# Patient Record
Sex: FEMALE | Race: WHITE
[De-identification: names, ages, dates, MRNs, and addresses within clinical notes are randomized per-mention and may not be internally consistent; named-entity substitution may affect disease eponyms.]

---

## 2019-12-17 ENCOUNTER — HOSPITAL ENCOUNTER (INPATIENT)
Dept: HOSPITAL 26 - MED | Age: 64
LOS: 3 days | Discharge: NURSING, REHAB OR LONG-TERM CARE | DRG: 637 | End: 2019-12-20
Attending: GENERAL PRACTICE | Admitting: GENERAL PRACTICE
Payer: COMMERCIAL

## 2019-12-17 VITALS — SYSTOLIC BLOOD PRESSURE: 172 MMHG | DIASTOLIC BLOOD PRESSURE: 89 MMHG

## 2019-12-17 VITALS — WEIGHT: 158 LBS | HEIGHT: 63 IN | BODY MASS INDEX: 28 KG/M2

## 2019-12-17 VITALS — DIASTOLIC BLOOD PRESSURE: 102 MMHG | SYSTOLIC BLOOD PRESSURE: 167 MMHG

## 2019-12-17 DIAGNOSIS — F15.90: ICD-10-CM

## 2019-12-17 DIAGNOSIS — I10: ICD-10-CM

## 2019-12-17 DIAGNOSIS — E11.65: ICD-10-CM

## 2019-12-17 DIAGNOSIS — E83.42: ICD-10-CM

## 2019-12-17 DIAGNOSIS — Z23: ICD-10-CM

## 2019-12-17 DIAGNOSIS — Z59.0: ICD-10-CM

## 2019-12-17 DIAGNOSIS — E11.00: Primary | ICD-10-CM

## 2019-12-17 DIAGNOSIS — F12.99: ICD-10-CM

## 2019-12-17 DIAGNOSIS — F17.210: ICD-10-CM

## 2019-12-17 DIAGNOSIS — E43: ICD-10-CM

## 2019-12-17 LAB
ALBUMIN FLD-MCNC: 2.7 G/DL (ref 3.4–5)
ANION GAP SERPL CALCULATED.3IONS-SCNC: 12.5 MMOL/L (ref 8–16)
APPEARANCE UR: (no result)
AST SERPL-CCNC: 12 U/L (ref 15–37)
BARBITURATES UR QL SCN: (no result) NG/ML
BASOPHILS # BLD AUTO: 0.1 K/UL (ref 0–0.22)
BASOPHILS NFR BLD AUTO: 1 % (ref 0–2)
BENZODIAZ UR QL SCN: (no result) NG/ML
BILIRUB SERPL-MCNC: 0.2 MG/DL (ref 0–1)
BILIRUB UR QL STRIP: NEGATIVE
BUN SERPL-MCNC: 7 MG/DL (ref 7–18)
BZE UR QL SCN: (no result) NG/ML
CANNABINOIDS UR QL SCN: (no result) NG/ML
CHLORIDE SERPL-SCNC: 99 MMOL/L (ref 98–107)
CO2 SERPL-SCNC: 28.1 MMOL/L (ref 21–32)
COLOR UR: YELLOW
CREAT SERPL-MCNC: 0.7 MG/DL (ref 0.6–1.3)
EOSINOPHIL # BLD AUTO: 0.1 K/UL (ref 0–0.4)
EOSINOPHIL NFR BLD AUTO: 0.6 % (ref 0–4)
ERYTHROCYTE [DISTWIDTH] IN BLOOD BY AUTOMATED COUNT: 13.1 % (ref 11.6–13.7)
GFR SERPL CREATININE-BSD FRML MDRD: 108 ML/MIN (ref 90–?)
GLUCOSE SERPL-MCNC: 400 MG/DL (ref 74–106)
GLUCOSE UR STRIP-MCNC: (no result) MG/DL
HCT VFR BLD AUTO: 42.5 % (ref 36–48)
HGB BLD-MCNC: 14.1 G/DL (ref 12–16)
HGB UR QL STRIP: (no result)
LEUKOCYTE ESTERASE UR QL STRIP: NEGATIVE
LYMPHOCYTES # BLD AUTO: 1.6 K/UL (ref 2.5–16.5)
LYMPHOCYTES NFR BLD AUTO: 17 % (ref 20.5–51.1)
MAGNESIUM SERPL-MCNC: 1.7 MG/DL (ref 1.8–2.4)
MCH RBC QN AUTO: 31 PG (ref 27–31)
MCHC RBC AUTO-ENTMCNC: 33 G/DL (ref 33–37)
MCV RBC AUTO: 94.4 FL (ref 80–94)
MONOCYTES # BLD AUTO: 0.6 K/UL (ref 0.8–1)
MONOCYTES NFR BLD AUTO: 6.6 % (ref 1.7–9.3)
NEUTROPHILS # BLD AUTO: 7 K/UL (ref 1.8–7.7)
NEUTROPHILS NFR BLD AUTO: 74.8 % (ref 42.2–75.2)
NITRITE UR QL STRIP: NEGATIVE
OPIATES UR QL SCN: (no result) NG/ML
PCP UR QL SCN: (no result) NG/ML
PH UR STRIP: 8 [PH] (ref 5–9)
PHOSPHATE SERPL-MCNC: 2.7 MG/DL (ref 2.5–4.9)
PLATELET # BLD AUTO: 338 K/UL (ref 140–450)
POTASSIUM SERPL-SCNC: 3.6 MMOL/L (ref 3.5–5.1)
PROTHROMBIN TIME: 9 SECS (ref 10.8–13.4)
RBC # BLD AUTO: 4.5 MIL/UL (ref 4.2–5.4)
RBC #/AREA URNS HPF: (no result) /HPF (ref 0–5)
SODIUM SERPL-SCNC: 136 MMOL/L (ref 136–145)
TSH SERPL DL<=0.05 MIU/L-ACNC: 1.25 UIU/ML (ref 0.34–3.74)
WBC # BLD AUTO: 9.3 K/UL (ref 4.8–10.8)
WBC,URINE: (no result) /HPF (ref 0–5)

## 2019-12-17 RX ADMIN — SODIUM CHLORIDE SCH MLS/HR: 9 INJECTION, SOLUTION INTRAVENOUS at 21:46

## 2019-12-17 RX ADMIN — INSULIN LISPRO PRN UNITS: 100 INJECTION, SOLUTION INTRAVENOUS; SUBCUTANEOUS at 21:58

## 2019-12-17 RX ADMIN — Medication SCH DEV: at 21:57

## 2019-12-17 SDOH — ECONOMIC STABILITY - HOUSING INSECURITY: HOMELESSNESS: Z59.0

## 2019-12-17 NOTE — NUR
Patient will be admitted to [facility]. TRANSFERRED VIA GURNEY. Will go to room 119B. 
Belongings list completed. Report to DIRK BRENNAN.

## 2019-12-17 NOTE — NUR
63 Y/O FEMALE BIBA WITH C/C OF HYPERGLYCEMIA. PER MEDIC REPORT, PT IS HOMELESS 
AND NONCOMPLIANT WITH MEDICATION. PT A/OX4. PER PT ALLERGIES TO PNC. MEDICAL HX 
OF DM. RX METFORMIN. DENIES V/N. PER PT DIARRHEA FOR "A NUMBER OF DAYS". 
CURRENT . SIDE RAIL X1.

## 2019-12-17 NOTE — NUR
ADMITTED A 64 YEAR OLD FEMALE VIA GURNEY FROM ED. RECEIVED REPORT FROM ED NURSE NIKI. 
PATIENT ALERT AND ORIENTED X4. NO APPARENT DISTRESS NOTED. DENIES PAIN NOR DISCOMFORT. SKIN 
IS INTACT. BED ON LOW POSITION. INTRODUCED SELF AND ORIENTED TO HOSPITAL ROUTINE AND 
ENVIRONMENT. CALL LIGHT WITHIN REACH. WILL CONTINUE TO MONITOR.

-------------------------------------------------------------------------------

Addendum: 12/18/19 at 0134 by Jacqueline Abad RN

-------------------------------------------------------------------------------

WITH IV ON LEFT AC 20G. SALINE LOCKED. PATIENT IS HOMELESS.

## 2019-12-17 NOTE — NUR
PATIENT ASLEEP IN BED. NO APPARENT DISTRESS NOTED. VISIBLE CHEST RISE AND FALL NOTED. WILL 
CONTINUE TO MONITOR.

## 2019-12-18 VITALS — SYSTOLIC BLOOD PRESSURE: 139 MMHG | DIASTOLIC BLOOD PRESSURE: 60 MMHG

## 2019-12-18 VITALS — DIASTOLIC BLOOD PRESSURE: 58 MMHG | SYSTOLIC BLOOD PRESSURE: 113 MMHG

## 2019-12-18 VITALS — SYSTOLIC BLOOD PRESSURE: 161 MMHG | DIASTOLIC BLOOD PRESSURE: 87 MMHG

## 2019-12-18 VITALS — SYSTOLIC BLOOD PRESSURE: 151 MMHG | DIASTOLIC BLOOD PRESSURE: 83 MMHG

## 2019-12-18 VITALS — SYSTOLIC BLOOD PRESSURE: 159 MMHG | DIASTOLIC BLOOD PRESSURE: 91 MMHG

## 2019-12-18 LAB
ANION GAP SERPL CALCULATED.3IONS-SCNC: 13.1 MMOL/L (ref 8–16)
BASOPHILS # BLD AUTO: 0 K/UL (ref 0–0.22)
BASOPHILS NFR BLD AUTO: 0.2 % (ref 0–2)
BUN SERPL-MCNC: 6 MG/DL (ref 7–18)
CHLORIDE SERPL-SCNC: 103 MMOL/L (ref 98–107)
CHOLEST/HDLC SERPL: 3 {RATIO} (ref 1–4.5)
CO2 SERPL-SCNC: 24.9 MMOL/L (ref 21–32)
CREAT SERPL-MCNC: 0.5 MG/DL (ref 0.6–1.3)
EOSINOPHIL # BLD AUTO: 0.1 K/UL (ref 0–0.4)
EOSINOPHIL NFR BLD AUTO: 0.8 % (ref 0–4)
ERYTHROCYTE [DISTWIDTH] IN BLOOD BY AUTOMATED COUNT: 13 % (ref 11.6–13.7)
GFR SERPL CREATININE-BSD FRML MDRD: 159 ML/MIN (ref 90–?)
GLUCOSE SERPL-MCNC: 155 MG/DL (ref 74–106)
HCT VFR BLD AUTO: 37 % (ref 36–48)
HDLC SERPL-MCNC: 59 MG/DL (ref 40–60)
HGB BLD-MCNC: 12.3 G/DL (ref 12–16)
LDLC SERPL CALC-MCNC: 93 MG/DL (ref 60–100)
LYMPHOCYTES # BLD AUTO: 2 K/UL (ref 2.5–16.5)
LYMPHOCYTES NFR BLD AUTO: 25.7 % (ref 20.5–51.1)
MAGNESIUM SERPL-MCNC: 1.4 MG/DL (ref 1.8–2.4)
MCH RBC QN AUTO: 31 PG (ref 27–31)
MCHC RBC AUTO-ENTMCNC: 33 G/DL (ref 33–37)
MCV RBC AUTO: 93.4 FL (ref 80–94)
MONOCYTES # BLD AUTO: 0.6 K/UL (ref 0.8–1)
MONOCYTES NFR BLD AUTO: 8.2 % (ref 1.7–9.3)
NEUTROPHILS # BLD AUTO: 5.1 K/UL (ref 1.8–7.7)
NEUTROPHILS NFR BLD AUTO: 65.1 % (ref 42.2–75.2)
PHOSPHATE SERPL-MCNC: 2.7 MG/DL (ref 2.5–4.9)
PLATELET # BLD AUTO: 301 K/UL (ref 140–450)
POTASSIUM SERPL-SCNC: 3 MMOL/L (ref 3.5–5.1)
RBC # BLD AUTO: 3.96 MIL/UL (ref 4.2–5.4)
SODIUM SERPL-SCNC: 138 MMOL/L (ref 136–145)
T4 SERPL-MCNC: 6.6 UG/DL (ref 4.5–12)
TRIGL SERPL-MCNC: 120 MG/DL (ref 30–150)
WBC # BLD AUTO: 7.9 K/UL (ref 4.8–10.8)

## 2019-12-18 PROCEDURE — 3E0234Z INTRODUCTION OF SERUM, TOXOID AND VACCINE INTO MUSCLE, PERCUTANEOUS APPROACH: ICD-10-PCS | Performed by: CONTRACTOR

## 2019-12-18 RX ADMIN — INSULIN LISPRO PRN UNITS: 100 INJECTION, SOLUTION INTRAVENOUS; SUBCUTANEOUS at 12:28

## 2019-12-18 RX ADMIN — Medication SCH DEV: at 06:32

## 2019-12-18 RX ADMIN — Medication SCH DEV: at 16:30

## 2019-12-18 RX ADMIN — POTASSIUM CHLORIDE SCH MEQ: 750 TABLET, FILM COATED, EXTENDED RELEASE ORAL at 20:05

## 2019-12-18 RX ADMIN — Medication SCH DEV: at 11:53

## 2019-12-18 RX ADMIN — INSULIN LISPRO PRN UNITS: 100 INJECTION, SOLUTION INTRAVENOUS; SUBCUTANEOUS at 17:50

## 2019-12-18 RX ADMIN — INSULIN LISPRO PRN UNITS: 100 INJECTION, SOLUTION INTRAVENOUS; SUBCUTANEOUS at 20:09

## 2019-12-18 RX ADMIN — POTASSIUM CHLORIDE SCH MEQ: 750 TABLET, FILM COATED, EXTENDED RELEASE ORAL at 08:33

## 2019-12-18 RX ADMIN — SODIUM CHLORIDE SCH MLS/HR: 9 INJECTION, SOLUTION INTRAVENOUS at 23:08

## 2019-12-18 RX ADMIN — INSULIN LISPRO PRN UNITS: 100 INJECTION, SOLUTION INTRAVENOUS; SUBCUTANEOUS at 06:16

## 2019-12-18 RX ADMIN — Medication SCH DEV: at 20:09

## 2019-12-18 RX ADMIN — LISINOPRIL SCH MG: 5 TABLET ORAL at 08:32

## 2019-12-18 RX ADMIN — SODIUM CHLORIDE SCH MLS/HR: 9 INJECTION, SOLUTION INTRAVENOUS at 05:09

## 2019-12-18 RX ADMIN — SODIUM CHLORIDE SCH MLS/HR: 9 INJECTION, SOLUTION INTRAVENOUS at 16:09

## 2019-12-18 NOTE — NUR
ROUNDS DONE. PATIENT ASLEEP IN BED. NO APPARENT DISTRESS  NOTED. BED ON LOW POSITION. 
VISIBLE CHEST RISE AND FALL NOTED. WILL CONTINUE TO MONITOR.

## 2019-12-18 NOTE — NUR
PATIENT HAS BEEN SCREENED AND CATEGORIZED AS MODERATE NUTRITION RISK. PATIENT WILL BE SEEN 
WITHIN 3-5 DAYS OF ADMISSION.



12/20/19 12/22/19



[address]IN [address]

## 2019-12-18 NOTE — NUR
CHECKS DONE. PATIENT ASLEEP IN BED. BED ON LOW POSITION. NO APPARENT DISTRESS NOTED. WILL 
CONTINUE TO MONITOR.

## 2019-12-18 NOTE — NUR
PT SLEEPING COMFORTABLY BUT AROUSABLE. NO S/S OF DISTRESS NOTED. NO COMPLAINTS OF PAIN. NO 
SOB. AFEBRILE. WILL CONTINUE TO MONITOR.

## 2019-12-18 NOTE — NUR
Shift report received from night shift nurse. Pt is in bed in stable condition. Call light 
in reach.

## 2019-12-18 NOTE — NUR
REPORT RECEIVED FROM AM NURSE AT BEDSIDE. PT IN STABLE CONDITION. AAOX3-4. INTRODUCED SELF 
TO PT. BOARD UPDATED. NO COMPLAINTS OF PAIN. NO SOB. AFEBRILE. PT IS WHEELCHAIR BOUND. IV 
SITE R FA 24G RUNNING NS@100ML/HR PATENT AND INTACT. SKIN WARM, DRY, AND INTACT WITH NO OPEN 
WOUNDS. BED LOCKED IN LOW POSITION. CALL BELL WITHIN REACH. SAFETY PRECAUTION IN PLACE. ALL 
NEEDS MET AT THIS TIME.

## 2019-12-18 NOTE — NUR
PATIENT AWAKE IN BED. NO APPARENT DISTRESS NOTED. DENIES PAIN NOR DISCOMFORT. WILL CONTINUE 
TO MONITOR.

## 2019-12-18 NOTE — NUR
PATIENT ASLEEP IN BED. BED ON LOW POSITION. NO APPARENT DISTRESS NOTED. VISIBLE CHEST RISE 
AND FALL NOTED. WILL CONTINUE TO MONITOR.

## 2019-12-19 VITALS — DIASTOLIC BLOOD PRESSURE: 93 MMHG | SYSTOLIC BLOOD PRESSURE: 161 MMHG

## 2019-12-19 VITALS — DIASTOLIC BLOOD PRESSURE: 84 MMHG | SYSTOLIC BLOOD PRESSURE: 143 MMHG

## 2019-12-19 VITALS — SYSTOLIC BLOOD PRESSURE: 142 MMHG | DIASTOLIC BLOOD PRESSURE: 74 MMHG

## 2019-12-19 LAB
ANION GAP SERPL CALCULATED.3IONS-SCNC: 11.6 MMOL/L (ref 8–16)
BASOPHILS # BLD AUTO: 0.1 K/UL (ref 0–0.22)
BASOPHILS NFR BLD AUTO: 1.2 % (ref 0–2)
BUN SERPL-MCNC: 9 MG/DL (ref 7–18)
CHLORIDE SERPL-SCNC: 102 MMOL/L (ref 98–107)
CO2 SERPL-SCNC: 26.6 MMOL/L (ref 21–32)
CREAT SERPL-MCNC: 0.5 MG/DL (ref 0.6–1.3)
EOSINOPHIL # BLD AUTO: 0 K/UL (ref 0–0.4)
EOSINOPHIL NFR BLD AUTO: 0.3 % (ref 0–4)
ERYTHROCYTE [DISTWIDTH] IN BLOOD BY AUTOMATED COUNT: 13.4 % (ref 11.6–13.7)
GFR SERPL CREATININE-BSD FRML MDRD: 159 ML/MIN (ref 90–?)
GLUCOSE SERPL-MCNC: 204 MG/DL (ref 74–106)
HCT VFR BLD AUTO: 37.8 % (ref 36–48)
HGB BLD-MCNC: 12.5 G/DL (ref 12–16)
LYMPHOCYTES # BLD AUTO: 1.8 K/UL (ref 2.5–16.5)
LYMPHOCYTES NFR BLD AUTO: 23 % (ref 20.5–51.1)
MAGNESIUM SERPL-MCNC: 1.5 MG/DL (ref 1.8–2.4)
MCH RBC QN AUTO: 31 PG (ref 27–31)
MCHC RBC AUTO-ENTMCNC: 33 G/DL (ref 33–37)
MCV RBC AUTO: 94 FL (ref 80–94)
MONOCYTES # BLD AUTO: 0.6 K/UL (ref 0.8–1)
MONOCYTES NFR BLD AUTO: 7.2 % (ref 1.7–9.3)
NEUTROPHILS # BLD AUTO: 5.4 K/UL (ref 1.8–7.7)
NEUTROPHILS NFR BLD AUTO: 68.3 % (ref 42.2–75.2)
PHOSPHATE SERPL-MCNC: 2.6 MG/DL (ref 2.5–4.9)
PLATELET # BLD AUTO: 306 K/UL (ref 140–450)
POTASSIUM SERPL-SCNC: 4.2 MMOL/L (ref 3.5–5.1)
RBC # BLD AUTO: 4.02 MIL/UL (ref 4.2–5.4)
SODIUM SERPL-SCNC: 136 MMOL/L (ref 136–145)
WBC # BLD AUTO: 8 K/UL (ref 4.8–10.8)

## 2019-12-19 RX ADMIN — INSULIN LISPRO PRN UNITS: 100 INJECTION, SOLUTION INTRAVENOUS; SUBCUTANEOUS at 06:10

## 2019-12-19 RX ADMIN — INSULIN LISPRO PRN UNITS: 100 INJECTION, SOLUTION INTRAVENOUS; SUBCUTANEOUS at 20:29

## 2019-12-19 RX ADMIN — SODIUM CHLORIDE SCH MLS/HR: 9 INJECTION, SOLUTION INTRAVENOUS at 12:19

## 2019-12-19 RX ADMIN — INSULIN LISPRO PRN UNITS: 100 INJECTION, SOLUTION INTRAVENOUS; SUBCUTANEOUS at 17:28

## 2019-12-19 RX ADMIN — Medication SCH DEV: at 20:51

## 2019-12-19 RX ADMIN — Medication SCH DEV: at 17:08

## 2019-12-19 RX ADMIN — INSULIN LISPRO PRN UNITS: 100 INJECTION, SOLUTION INTRAVENOUS; SUBCUTANEOUS at 12:11

## 2019-12-19 RX ADMIN — Medication SCH DEV: at 12:08

## 2019-12-19 RX ADMIN — Medication SCH DEV: at 06:08

## 2019-12-19 RX ADMIN — LISINOPRIL SCH MG: 5 TABLET ORAL at 09:02

## 2019-12-19 NOTE — NUR
RECEIVED PT FROM ANTHONY MENDEZ SHIFT NURSE FOR CONTINUITY OF CARE. PT AWAKE, ALERT ORIENTED X 4, 
AMBULATORY. POC DISCUSSED. PLACED IN LOW BED.

## 2019-12-19 NOTE — NUR
PATIENT IS RESTING IN BED, TOLERATING MEDICATIONS WELL, DENIES PAIN AT THIS TIME,  IS 
AT BEDSIDE. LUNCH TOLERATED WELL. BED IN LOW POSITION, CALL LIGHT ON AND WITHIN REACH. WILL 
CONTINUE TO MONITOR.

## 2019-12-19 NOTE — NUR
PT SLEEPING BUT EASILY AROUSABLE BY VERBAL STIMULI, AMBULATORY, PT IN STABLE CONDITION 
ENDORSED TO NEXT SHIFT.

## 2019-12-19 NOTE — NUR
PATIENT IS RESTING IN BED, 4 GRAMS OF MAGNESIUM SULFATE COMPLETED IV. PATIENT IS RESTING IN 
BED, DENIES PAIN AT THIS TIME. NS RUNNING  ML/HR. BED IN LOW POSITION, CALL LIGHT ON 
AND WITHIN REACH. WILL CONTINUE TO MONITOR.

## 2019-12-19 NOTE — NUR
REPORT RECEIVED FROM NIGHT SHIFT NURSE FOR CONTINUATION OF CARE, PATIENT IS RESTING IN BED 
AAO X 4. NO SIGNS OF DISTRESS NOTED. BED IN LOW POSITION, CALL LIGHT ON AND WITHIN REACH. 
WILL CONTINUE TO MONITOR.

## 2019-12-19 NOTE — NUR
PATIENT IS RESTING IN BED, SPONGE BATH GIVEN, NO SIGNS OF DISTRESS NOTED, RESTING IN BED. 
WILL CONTINUE TO MONITOR.

## 2019-12-19 NOTE — NUR
PATIENT IS RESTING IN BED WITH  AT BEDSIDE, PATIENT APPEARS DISHEVELED. COMPLIANT 
WITH MEDICATIONS, DENIES PAIN. MAG RIDER X2 ORDERED FOR LOW MAG LEVEL. OBSERVED CHEST RISE 
AND FALL. BED IN LOW POSITION, CALL LIGHT ON AND WITHIN REACH. WILL CONTINUE TO MONITOR.

## 2019-12-19 NOTE — NUR
PT SLEEPING, EASILY AROUSABLE, VITAL SIGNS STABLE, NO SIGNS OF PAIN OR SOB, IVF INFUSING 
WELL, CONTINUE TO MONITOR CLOSELY.

## 2019-12-19 NOTE — NUR
RECEIVED PT SLEEPING, EASILY AROUSABLE, AAOX4, ABLE TO MAKE NEEDS KNOWN, DENIES ANY PAIN, NO 
SOB NOTED, IVF INFUSING WELL, PLAN OF CARE DISCUSSED, SAFETY MEASURES IN PLACE, CALL LIGHT 
WITHIN REACH,  AT BEDSIDE.

## 2019-12-20 VITALS — DIASTOLIC BLOOD PRESSURE: 84 MMHG | SYSTOLIC BLOOD PRESSURE: 163 MMHG

## 2019-12-20 VITALS — DIASTOLIC BLOOD PRESSURE: 67 MMHG | SYSTOLIC BLOOD PRESSURE: 136 MMHG

## 2019-12-20 LAB
ANION GAP SERPL CALCULATED.3IONS-SCNC: 12 MMOL/L (ref 8–16)
BASOPHILS # BLD AUTO: 0 K/UL (ref 0–0.22)
BASOPHILS NFR BLD AUTO: 0.3 % (ref 0–2)
BUN SERPL-MCNC: 12 MG/DL (ref 7–18)
CHLORIDE SERPL-SCNC: 104 MMOL/L (ref 98–107)
CO2 SERPL-SCNC: 23.5 MMOL/L (ref 21–32)
CREAT SERPL-MCNC: 0.5 MG/DL (ref 0.6–1.3)
EOSINOPHIL # BLD AUTO: 0 K/UL (ref 0–0.4)
EOSINOPHIL NFR BLD AUTO: 0.4 % (ref 0–4)
ERYTHROCYTE [DISTWIDTH] IN BLOOD BY AUTOMATED COUNT: 13.3 % (ref 11.6–13.7)
GFR SERPL CREATININE-BSD FRML MDRD: 159 ML/MIN (ref 90–?)
GLUCOSE SERPL-MCNC: 159 MG/DL (ref 74–106)
HCT VFR BLD AUTO: 35.6 % (ref 36–48)
HGB BLD-MCNC: 11.8 G/DL (ref 12–16)
LYMPHOCYTES # BLD AUTO: 1.4 K/UL (ref 2.5–16.5)
LYMPHOCYTES NFR BLD AUTO: 17.5 % (ref 20.5–51.1)
MAGNESIUM SERPL-MCNC: 1.5 MG/DL (ref 1.8–2.4)
MCH RBC QN AUTO: 31 PG (ref 27–31)
MCHC RBC AUTO-ENTMCNC: 33 G/DL (ref 33–37)
MCV RBC AUTO: 93.9 FL (ref 80–94)
MONOCYTES # BLD AUTO: 0.7 K/UL (ref 0.8–1)
MONOCYTES NFR BLD AUTO: 8.3 % (ref 1.7–9.3)
NEUTROPHILS # BLD AUTO: 5.9 K/UL (ref 1.8–7.7)
NEUTROPHILS NFR BLD AUTO: 73.5 % (ref 42.2–75.2)
PHOSPHATE SERPL-MCNC: 2.7 MG/DL (ref 2.5–4.9)
PLATELET # BLD AUTO: 294 K/UL (ref 140–450)
POTASSIUM SERPL-SCNC: 3.5 MMOL/L (ref 3.5–5.1)
RBC # BLD AUTO: 3.79 MIL/UL (ref 4.2–5.4)
SODIUM SERPL-SCNC: 136 MMOL/L (ref 136–145)
WBC # BLD AUTO: 8.1 K/UL (ref 4.8–10.8)

## 2019-12-20 RX ADMIN — SODIUM CHLORIDE SCH MLS/HR: 9 INJECTION, SOLUTION INTRAVENOUS at 01:18

## 2019-12-20 RX ADMIN — LISINOPRIL SCH MG: 5 TABLET ORAL at 08:51

## 2019-12-20 RX ADMIN — Medication SCH DEV: at 06:39

## 2019-12-20 RX ADMIN — Medication SCH DEV: at 12:29

## 2019-12-20 RX ADMIN — INSULIN LISPRO PRN UNITS: 100 INJECTION, SOLUTION INTRAVENOUS; SUBCUTANEOUS at 12:53

## 2019-12-20 RX ADMIN — SODIUM CHLORIDE SCH MLS/HR: 9 INJECTION, SOLUTION INTRAVENOUS at 08:09

## 2019-12-20 NOTE — NUR
REPORT RECEIVED FROM NIGHT SHIFT NURSE FOR CONTINUITY OF CARE, PATIENT IS RESTING IN BED, 
BED IN LOW POSITION, DENIES PAIN AT THIS TIME, DISCHARGE ORDER PER MD, PREPARING FOR DISPO 
TODAY. WILL CONTINUE TO MONITOR.

## 2019-12-20 NOTE — NUR
ROUNDS MADE, PATIENT IS RESTING IN BED WITH  AT BEDSIDE. DISCHARGE ORDER IN FOR 
TODAY, PREPARING FOR DISPO. PATIENT STATES NO CONCERNS REGARDING DISCHARGE. BED IN LOW 
POSITION, CALL LIGHT ON AND WITHIN REACH, WILL CONTINUE TO MONITOR.

## 2019-12-20 NOTE — NUR
TRANSPORT CAME FROM [facility] AND PICKED UP BEBE, IV WAS REMOVED, NAME TAGS REMOVED, 
GOWN CHANGED, PATIENT BELONGINGS ACCOUNTED FOR. PATIENT WAS DISCHARGED FROM [facility] FOR CONTINUATION OF CARE AT [facility] TO CONTINUE PHYSICAL THERAPY. 
DISCHARGE PAPERWORK GIVEN TO PATIENT.

## 2019-12-20 NOTE — NUR
PT SLEEPING, EASILY AROUSABLE, BLOOD SUGAR CHECKED WITH 149 RESULT, NO SIGNS OF PAIN OR SOB, 
IVF INFUSING WELL,  AT BEDSIDE, MONITORED CLOSELY.

## 2019-12-20 NOTE — NUR
PATIENT IS RESTING IN BED WAITING FOR TRANSPORT TO TRANSFER TO [facility]. PATIENT IS 
RESTING IN BED, BED IN LOW POSITION, CALL LIGHT ON AND WITHIN REACH. WILL CONTINUE TO 
MONITOR.